# Patient Record
Sex: FEMALE | URBAN - METROPOLITAN AREA
[De-identification: names, ages, dates, MRNs, and addresses within clinical notes are randomized per-mention and may not be internally consistent; named-entity substitution may affect disease eponyms.]

---

## 2023-03-01 ENCOUNTER — WEB ENCOUNTER (OUTPATIENT)
Dept: URBAN - METROPOLITAN AREA CLINIC 9 | Facility: CLINIC | Age: 28
End: 2023-03-01

## 2023-03-01 ENCOUNTER — OFFICE VISIT (OUTPATIENT)
Dept: URBAN - METROPOLITAN AREA CLINIC 9 | Facility: CLINIC | Age: 28
End: 2023-03-01
Payer: COMMERCIAL

## 2023-03-01 VITALS
DIASTOLIC BLOOD PRESSURE: 80 MMHG | HEIGHT: 66 IN | BODY MASS INDEX: 19.77 KG/M2 | WEIGHT: 123 LBS | SYSTOLIC BLOOD PRESSURE: 110 MMHG

## 2023-03-01 DIAGNOSIS — R14.0 BLOAT: ICD-10-CM

## 2023-03-01 DIAGNOSIS — R10.13 LOWER ABDOMINAL PAIN: ICD-10-CM

## 2023-03-01 PROCEDURE — 99204 OFFICE O/P NEW MOD 45 MIN: CPT | Performed by: INTERNAL MEDICINE

## 2023-03-01 RX ORDER — SODIUM, POTASSIUM,MAG SULFATES 17.5-3.13G
177ML SOLUTION, RECONSTITUTED, ORAL ORAL AS DIRECTED
Qty: 1 | Refills: 0 | OUTPATIENT
Start: 2023-03-01 | End: 2023-03-02

## 2023-03-01 RX ORDER — OMEPRAZOLE 20 MG/1
1 CAPSULE 30 MINUTES BEFORE MORNING MEAL CAPSULE, DELAYED RELEASE ORAL ONCE A DAY
Status: ACTIVE | COMMUNITY

## 2023-03-01 RX ORDER — DICYCLOMINE HYDROCHLORIDE 10 MG/1
1 TABLET CAPSULE ORAL THREE TIMES A DAY
Qty: 90 TABLET | Refills: 0 | OUTPATIENT
Start: 2023-03-01 | End: 2023-03-31

## 2023-03-01 NOTE — HPI-TODAY'S VISIT:
27-year-old female comes in to [facility] care.  She says that she has had problems with her GI tract since she was a child.  Her main symptom is she will wake up in the morning and get lower abdominal cramping and bloating.  It prevents her from kind of eating.  This can last several hours.  Omeprazole can actually help make it go away and then she will be able to eat.  Again this is been going on since childhood.  No recent weight loss.  No rectal bleeding.  Normal bowel movements.  No family history of GI malignancies.

## 2023-03-01 NOTE — PHYSICAL EXAM HENT:
Head, normocephalic, atraumatic, Face, Face within normal limits, Ears, External ears within normal limits, Nose/Nasopharynx, External nose normal appearance, nares patent, no nasal discharge, Mouth and Throat, Oral cavity appearance normal, Lips, Appearance normal no

## 2023-04-03 ENCOUNTER — OFFICE VISIT (OUTPATIENT)
Dept: URBAN - METROPOLITAN AREA SURGERY CENTER 9 | Facility: SURGERY CENTER | Age: 28
End: 2023-04-03
Payer: COMMERCIAL

## 2023-04-03 DIAGNOSIS — R10.84 ABDOMINAL CRAMPING, GENERALIZED: ICD-10-CM

## 2023-04-03 DIAGNOSIS — K64.8 EXTERNAL HEMORRHOIDS: ICD-10-CM

## 2023-04-03 PROCEDURE — 45378 DIAGNOSTIC COLONOSCOPY: CPT | Performed by: INTERNAL MEDICINE

## 2023-04-03 RX ORDER — OMEPRAZOLE 20 MG/1
1 CAPSULE 30 MINUTES BEFORE MORNING MEAL CAPSULE, DELAYED RELEASE ORAL ONCE A DAY
Status: ACTIVE | COMMUNITY

## 2023-06-05 ENCOUNTER — OFFICE VISIT (OUTPATIENT)
Dept: URBAN - METROPOLITAN AREA CLINIC 9 | Facility: CLINIC | Age: 28
End: 2023-06-05

## 2023-06-05 NOTE — HPI-TODAY'S VISIT:
27-year-old female comes in to establish care.  She says that she has had problems with her GI tract since she was a child.  Her main symptom is she will wake up in the morning and get lower abdominal cramping and bloating.  It prevents her from kind of eating.  This can last several hours.  Omeprazole can actually help make it go away and then she will be able to eat.  Again this is been going on since childhood.  No recent weight loss.  No rectal bleeding.  Normal bowel movements.  No family history of GI malignancies. At last visit, we proceeded with colonoscopy which was normal  Also did celiac testing Low fodmaps diet and  trial of nighttime bentyl  Next step was CTE Does not look like celic testing was done

## 2023-06-19 ENCOUNTER — WEB ENCOUNTER (OUTPATIENT)
Dept: URBAN - METROPOLITAN AREA CLINIC 9 | Facility: CLINIC | Age: 28
End: 2023-06-19

## 2023-06-19 ENCOUNTER — DASHBOARD ENCOUNTERS (OUTPATIENT)
Age: 28
End: 2023-06-19

## 2023-06-19 ENCOUNTER — OFFICE VISIT (OUTPATIENT)
Dept: URBAN - METROPOLITAN AREA CLINIC 9 | Facility: CLINIC | Age: 28
End: 2023-06-19
Payer: COMMERCIAL

## 2023-06-19 VITALS
HEIGHT: 66 IN | WEIGHT: 132 LBS | DIASTOLIC BLOOD PRESSURE: 68 MMHG | SYSTOLIC BLOOD PRESSURE: 110 MMHG | BODY MASS INDEX: 21.21 KG/M2

## 2023-06-19 DIAGNOSIS — R14.0 BLOATING: ICD-10-CM

## 2023-06-19 DIAGNOSIS — R10.30 LOWER ABDOMINAL PAIN: ICD-10-CM

## 2023-06-19 PROCEDURE — 99214 OFFICE O/P EST MOD 30 MIN: CPT | Performed by: INTERNAL MEDICINE

## 2023-06-19 RX ORDER — OMEPRAZOLE 20 MG/1
1 CAPSULE 30 MINUTES BEFORE MORNING MEAL CAPSULE, DELAYED RELEASE ORAL ONCE A DAY
Status: ACTIVE | COMMUNITY

## 2023-06-19 RX ORDER — IBUPROFEN 800 MG/1
TABLET, FILM COATED ORAL
Qty: 30 TABLET | Status: ACTIVE | COMMUNITY

## 2023-06-19 NOTE — HPI-TODAY'S VISIT:
27-year-old female comes in to establish care.  She says that she has had problems with her GI tract since she was a child.  Her main symptom is she will wake up in the morning and get lower abdominal cramping and bloating.  It prevents her from kind of eating.  This can last several hours.  Omeprazole can actually help make it go away and then she will be able to eat.  Again this is been going on since childhood.  No recent weight loss.  No rectal bleeding.  Normal bowel movements.  No family history of GI malignancies. At last visit, we proceeded with colonoscopy which was normal  Also did celiac testing Low fodmaps diet and  trial of nighttime bentyl  Next step was CTE Does not look like celic testing was done  She is here for follow-up today.  She has not done the celiac testing.  She is on the omeprazole.  On this she is symptoms are improved but she still getting intermittent abdominal cramping in the morning.  She has not tried Bentyl at bedtime.

## 2023-06-22 ENCOUNTER — TELEPHONE ENCOUNTER (OUTPATIENT)
Dept: URBAN - METROPOLITAN AREA CLINIC 9 | Facility: CLINIC | Age: 28
End: 2023-06-22

## 2023-06-29 LAB
IMMUNOGLOBULIN A, QN, SERUM: 145
INTERPRETATION: (no result)
T-TRANSGLUTAMINASE (TTG) IGA: <1